# Patient Record
Sex: MALE | Race: WHITE | URBAN - METROPOLITAN AREA
[De-identification: names, ages, dates, MRNs, and addresses within clinical notes are randomized per-mention and may not be internally consistent; named-entity substitution may affect disease eponyms.]

---

## 2024-01-10 ENCOUNTER — APPOINTMENT (OUTPATIENT)
Dept: ENDOCRINOLOGY | Facility: CLINIC | Age: 49
End: 2024-01-10
Payer: COMMERCIAL

## 2024-03-20 ENCOUNTER — OFFICE VISIT (OUTPATIENT)
Dept: ENDOCRINOLOGY | Facility: CLINIC | Age: 49
End: 2024-03-20

## 2024-03-20 VITALS
DIASTOLIC BLOOD PRESSURE: 108 MMHG | SYSTOLIC BLOOD PRESSURE: 159 MMHG | HEIGHT: 72 IN | BODY MASS INDEX: 29.66 KG/M2 | WEIGHT: 219 LBS | HEART RATE: 65 BPM

## 2024-03-20 DIAGNOSIS — E29.1 HYPOGONADISM MALE: Primary | ICD-10-CM

## 2024-03-20 PROCEDURE — 99213 OFFICE O/P EST LOW 20 MIN: CPT | Performed by: INTERNAL MEDICINE

## 2024-03-20 PROCEDURE — 1036F TOBACCO NON-USER: CPT | Performed by: INTERNAL MEDICINE

## 2024-03-20 RX ORDER — TESTOSTERONE CYPIONATE 200 MG/ML
200 INJECTION, SOLUTION INTRAMUSCULAR
Qty: 10 ML | Refills: 0 | Status: SHIPPED | OUTPATIENT
Start: 2024-03-20

## 2024-03-20 RX ORDER — SYRINGE,SAFETY WITH NEEDLE,3ML 22GX1"
SYRINGE, EMPTY DISPOSABLE MISCELLANEOUS
COMMUNITY
End: 2024-03-20 | Stop reason: SDUPTHER

## 2024-03-20 RX ORDER — TESTOSTERONE CYPIONATE 200 MG/ML
200 INJECTION, SOLUTION INTRAMUSCULAR
COMMUNITY
End: 2024-03-20 | Stop reason: SDUPTHER

## 2024-03-20 RX ORDER — SYRINGE,SAFETY WITH NEEDLE,3ML 22GX1"
1 SYRINGE, EMPTY DISPOSABLE MISCELLANEOUS
Qty: 13 EACH | Refills: 3 | Status: SHIPPED | OUTPATIENT
Start: 2024-03-20

## 2024-03-20 NOTE — PROGRESS NOTES
Chief Complaint  Chief Complaints    ·   Follow up visit for hypogonadism.      History of Present Illness  I have personally reviewed the [facility] report for SUKHJINDER WEINSTEIN. I have considered the risks of abuse, dependence, addiction and diversion.   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 Patient came today for follow up last time seen on the clinic was a year ago.     Patient today stated that has been feeling well, denied any complain, has good energy with no problems on his usual activities, good sleep, denied night sweats, denied hot flushes, is sexually active does not have problems with sexual activity except at times premature ejaculation. No symptoms to suggest prostatism     Current Regimen   Testosterone 200 mg every 2 weeks; good compliance    Impression/ Plan    Clinically stable; no new concerns/ issues. Will keep same Rx. Return in one year. Singed new [facility] agreement for testosterone therapy       Charles Chen MD

## 2024-04-05 ENCOUNTER — APPOINTMENT (OUTPATIENT)
Dept: ENDOCRINOLOGY | Facility: CLINIC | Age: 49
End: 2024-04-05

## 2025-03-05 ENCOUNTER — APPOINTMENT (OUTPATIENT)
Dept: ENDOCRINOLOGY | Facility: CLINIC | Age: 50
End: 2025-03-05
Payer: COMMERCIAL

## 2025-03-05 VITALS — WEIGHT: 253 LBS | BODY MASS INDEX: 34.31 KG/M2

## 2025-03-05 DIAGNOSIS — E29.1 HYPOGONADISM MALE: Primary | ICD-10-CM

## 2025-03-05 DIAGNOSIS — E29.1 HYPOGONADISM MALE: ICD-10-CM

## 2025-03-05 PROCEDURE — 99213 OFFICE O/P EST LOW 20 MIN: CPT | Performed by: INTERNAL MEDICINE

## 2025-03-05 RX ORDER — SYRINGE,SAFETY WITH NEEDLE,3ML 22GX1"
1 SYRINGE, EMPTY DISPOSABLE MISCELLANEOUS
Qty: 13 EACH | Refills: 3 | Status: SHIPPED | OUTPATIENT
Start: 2025-03-05

## 2025-03-05 RX ORDER — TESTOSTERONE CYPIONATE 200 MG/ML
200 INJECTION, SOLUTION INTRAMUSCULAR
Qty: 10 ML | Refills: 0 | Status: SHIPPED | OUTPATIENT
Start: 2025-03-05

## 2025-03-05 ASSESSMENT — PAIN SCALES - GENERAL: PAINLEVEL_OUTOF10: 0-NO PAIN

## 2025-03-05 NOTE — PROGRESS NOTES
Chief Complaint  Chief Complaints    ·   Follow up visit for hypogonadism.      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 Patient came today for follow up last time seen on the clinic was a year ago.     Patient today stated that has been feeling well, denied any complain, has good energy with no problems on his usual activities, good sleep, denied night sweats, denied hot flushes, is sexually active does not have problems with sexual activity except at times premature ejaculation. No symptoms to suggest prostatism     Current Regimen   Testosterone 200 mg every 2 weeks; good compliance     Impression/ Plan     Clinically stable; no new concerns/ issues. Will keep same Rx. Return in one year. Singed new [facility] agreement for testosterone therapy   Charles Chen MD